# Patient Record
Sex: FEMALE | Race: WHITE
[De-identification: names, ages, dates, MRNs, and addresses within clinical notes are randomized per-mention and may not be internally consistent; named-entity substitution may affect disease eponyms.]

---

## 2019-04-14 ENCOUNTER — HOSPITAL ENCOUNTER (EMERGENCY)
Dept: HOSPITAL 84 - D.ER | Age: 49
Discharge: HOME | End: 2019-04-14
Payer: COMMERCIAL

## 2019-04-14 DIAGNOSIS — G43.909: Primary | ICD-10-CM

## 2019-06-24 ENCOUNTER — HOSPITAL ENCOUNTER (OUTPATIENT)
Dept: HOSPITAL 84 - D.LABREF | Age: 49
Discharge: HOME | End: 2019-06-24
Attending: INTERNAL MEDICINE
Payer: COMMERCIAL

## 2019-06-24 VITALS — BODY MASS INDEX: 29.8 KG/M2

## 2019-06-24 DIAGNOSIS — D64.9: ICD-10-CM

## 2019-06-24 DIAGNOSIS — Z12.11: Primary | ICD-10-CM

## 2019-06-24 LAB
BASOPHILS NFR BLD AUTO: 0.4 % (ref 0–2)
EOSINOPHIL NFR BLD: 2.2 % (ref 0–7)
ERYTHROCYTE [DISTWIDTH] IN BLOOD BY AUTOMATED COUNT: 14.2 % (ref 11.5–14.5)
HCT VFR BLD CALC: 34.2 % (ref 36–48)
HGB BLD-MCNC: 11.5 G/DL (ref 12–16)
IMM GRANULOCYTES NFR BLD: 0 % (ref 0–5)
LYMPHOCYTES NFR BLD AUTO: 28.4 % (ref 15–50)
MCH RBC QN AUTO: 28.8 PG (ref 26–34)
MCHC RBC AUTO-ENTMCNC: 33.6 G/DL (ref 31–37)
MCV RBC: 85.7 FL (ref 80–100)
MONOCYTES NFR BLD: 7.8 % (ref 2–11)
NEUTROPHILS NFR BLD AUTO: 61.2 % (ref 40–80)
PLATELET # BLD: 242 10X3/UL (ref 130–400)
PMV BLD AUTO: 10.1 FL (ref 7.4–10.4)
RBC # BLD AUTO: 3.99 10X6/UL (ref 4–5.4)
WBC # BLD AUTO: 5 10X3/UL (ref 4.8–10.8)

## 2019-07-13 ENCOUNTER — HOSPITAL ENCOUNTER (INPATIENT)
Dept: HOSPITAL 84 - D.ER | Age: 49
LOS: 1 days | Discharge: LEFT BEFORE DISCHARGE | DRG: 638 | End: 2019-07-14
Attending: FAMILY MEDICINE | Admitting: FAMILY MEDICINE
Payer: MEDICAID

## 2019-07-13 VITALS
WEIGHT: 183.38 LBS | WEIGHT: 183.38 LBS | BODY MASS INDEX: 27.79 KG/M2 | HEIGHT: 68 IN | BODY MASS INDEX: 27.79 KG/M2 | HEIGHT: 68 IN

## 2019-07-13 DIAGNOSIS — I10: ICD-10-CM

## 2019-07-13 DIAGNOSIS — I31.8: ICD-10-CM

## 2019-07-13 DIAGNOSIS — N39.0: ICD-10-CM

## 2019-07-13 DIAGNOSIS — E78.5: ICD-10-CM

## 2019-07-13 DIAGNOSIS — E11.65: Primary | ICD-10-CM

## 2019-07-13 DIAGNOSIS — D16.02: ICD-10-CM

## 2019-07-13 DIAGNOSIS — N28.1: ICD-10-CM

## 2019-07-13 DIAGNOSIS — N28.0: ICD-10-CM

## 2019-07-13 LAB
ALBUMIN SERPL-MCNC: 4.3 G/DL (ref 3.4–5)
ALP SERPL-CCNC: 122 U/L (ref 46–116)
ALT SERPL-CCNC: 33 U/L (ref 10–68)
ANION GAP SERPL CALC-SCNC: 14.5 MMOL/L (ref 8–16)
APPEARANCE UR: CLEAR
BACTERIA #/AREA URNS HPF: (no result) /HPF
BASOPHILS NFR BLD AUTO: 0.2 % (ref 0–2)
BILIRUB SERPL-MCNC: 0.22 MG/DL (ref 0.2–1.3)
BILIRUB SERPL-MCNC: NEGATIVE MG/DL
BUN SERPL-MCNC: 20 MG/DL (ref 7–18)
CALCIUM SERPL-MCNC: 9.7 MG/DL (ref 8.5–10.1)
CHLORIDE SERPL-SCNC: 92 MMOL/L (ref 98–107)
CO2 SERPL-SCNC: 27.7 MMOL/L (ref 21–32)
COLOR UR: (no result)
CREAT SERPL-MCNC: 1.5 MG/DL (ref 0.6–1.3)
EOSINOPHIL NFR BLD: 0.6 % (ref 0–7)
ERYTHROCYTE [DISTWIDTH] IN BLOOD BY AUTOMATED COUNT: 13.5 % (ref 11.5–14.5)
EST. AVERAGE GLUCOSE BLD GHB EST-MCNC: 229 MG/DL (ref 74–154)
GLOBULIN SER-MCNC: 5 G/L
GLUCOSE SERPL-MCNC: 1000 MG/DL
GLUCOSE SERPL-MCNC: 587 MG/DL (ref 74–106)
HCT VFR BLD CALC: 39.1 % (ref 36–48)
HGB BLD-MCNC: 13.2 G/DL (ref 12–16)
IMM GRANULOCYTES NFR BLD: 0.2 % (ref 0–5)
KETONES UR STRIP-MCNC: NEGATIVE MG/DL
LYMPHOCYTES NFR BLD AUTO: 18.5 % (ref 15–50)
MAGNESIUM SERPL-MCNC: 2.2 MG/DL (ref 1.8–2.4)
MCH RBC QN AUTO: 29.3 PG (ref 26–34)
MCHC RBC AUTO-ENTMCNC: 33.8 G/DL (ref 31–37)
MCV RBC: 86.7 FL (ref 80–100)
MONOCYTES NFR BLD: 7.3 % (ref 2–11)
NEUTROPHILS NFR BLD AUTO: 73.2 % (ref 40–80)
NITRITE UR-MCNC: NEGATIVE MG/ML
OSMOLALITY SERPL CALC.SUM OF ELEC: 290 MOSM/KG (ref 275–300)
PH UR STRIP: 5 [PH] (ref 5–6)
PLATELET # BLD: 251 10X3/UL (ref 130–400)
PMV BLD AUTO: 10.4 FL (ref 7.4–10.4)
POTASSIUM SERPL-SCNC: 4.2 MMOL/L (ref 3.5–5.1)
PROT SERPL-MCNC: 9.3 G/DL (ref 6.4–8.2)
PROT UR-MCNC: NEGATIVE MG/DL
RBC # BLD AUTO: 4.51 10X6/UL (ref 4–5.4)
RBC #/AREA URNS HPF: (no result) /HPF (ref 0–5)
SODIUM SERPL-SCNC: 130 MMOL/L (ref 136–145)
SP GR UR STRIP: 1.01 (ref 1–1.02)
SQUAMOUS #/AREA URNS HPF: (no result) /HPF (ref 0–5)
UROBILINOGEN UR-MCNC: NORMAL MG/DL
WBC # BLD AUTO: 8.1 10X3/UL (ref 4.8–10.8)

## 2019-07-13 NOTE — NUR
PT THO THE FLOOR VIA WHEELCHAIR. PT A&O X4. ASSISTED PT GETTING SET UP IN HER
ROOM. CALL LIGTH WITHIN REACH AND BED IN LOWEST POSITION.

## 2019-07-14 VITALS — SYSTOLIC BLOOD PRESSURE: 182 MMHG | DIASTOLIC BLOOD PRESSURE: 96 MMHG

## 2019-07-14 VITALS — SYSTOLIC BLOOD PRESSURE: 171 MMHG | DIASTOLIC BLOOD PRESSURE: 93 MMHG

## 2019-07-14 VITALS — DIASTOLIC BLOOD PRESSURE: 76 MMHG | SYSTOLIC BLOOD PRESSURE: 139 MMHG

## 2019-07-14 VITALS — SYSTOLIC BLOOD PRESSURE: 161 MMHG | DIASTOLIC BLOOD PRESSURE: 94 MMHG

## 2019-07-14 VITALS — SYSTOLIC BLOOD PRESSURE: 144 MMHG | DIASTOLIC BLOOD PRESSURE: 76 MMHG

## 2019-07-14 LAB
ALBUMIN SERPL-MCNC: 3.5 G/DL (ref 3.4–5)
ALP SERPL-CCNC: 103 U/L (ref 46–116)
ALT SERPL-CCNC: 24 U/L (ref 10–68)
ANION GAP SERPL CALC-SCNC: 13.5 MMOL/L (ref 8–16)
BASOPHILS NFR BLD AUTO: 0.3 % (ref 0–2)
BILIRUB SERPL-MCNC: 0.2 MG/DL (ref 0.2–1.3)
BUN SERPL-MCNC: 18 MG/DL (ref 7–18)
CALCIUM SERPL-MCNC: 8.8 MG/DL (ref 8.5–10.1)
CHLORIDE SERPL-SCNC: 100 MMOL/L (ref 98–107)
CHOLEST/HDLC SERPL: 3.3 RATIO (ref 2.3–4.1)
CO2 SERPL-SCNC: 25.8 MMOL/L (ref 21–32)
CREAT SERPL-MCNC: 1.1 MG/DL (ref 0.6–1.3)
EOSINOPHIL NFR BLD: 1.2 % (ref 0–7)
ERYTHROCYTE [DISTWIDTH] IN BLOOD BY AUTOMATED COUNT: 13.3 % (ref 11.5–14.5)
EST. AVERAGE GLUCOSE BLD GHB EST-MCNC: 246 MG/DL (ref 74–154)
GLOBULIN SER-MCNC: 4.3 G/L
GLUCOSE SERPL-MCNC: 321 MG/DL (ref 74–106)
HCT VFR BLD CALC: 35.5 % (ref 36–48)
HDLC SERPL-MCNC: 67 MG/DL (ref 32–96)
HGB BLD-MCNC: 12 G/DL (ref 12–16)
IMM GRANULOCYTES NFR BLD: 0.3 % (ref 0–5)
LDL-HDL RATIO: 2 RATIO (ref 1.5–3.5)
LDLC SERPL-MCNC: 136 MG/DL (ref 0–100)
LYMPHOCYTES NFR BLD AUTO: 24.9 % (ref 15–50)
MCH RBC QN AUTO: 29.1 PG (ref 26–34)
MCHC RBC AUTO-ENTMCNC: 33.8 G/DL (ref 31–37)
MCV RBC: 86 FL (ref 80–100)
MONOCYTES NFR BLD: 6.3 % (ref 2–11)
NEUTROPHILS NFR BLD AUTO: 67 % (ref 40–80)
OSMOLALITY SERPL CALC.SUM OF ELEC: 283 MOSM/KG (ref 275–300)
PLATELET # BLD: 190 10X3/UL (ref 130–400)
PMV BLD AUTO: 10.6 FL (ref 7.4–10.4)
POTASSIUM SERPL-SCNC: 4.3 MMOL/L (ref 3.5–5.1)
PROT SERPL-MCNC: 7.8 G/DL (ref 6.4–8.2)
RBC # BLD AUTO: 4.13 10X6/UL (ref 4–5.4)
SODIUM SERPL-SCNC: 135 MMOL/L (ref 136–145)
TRIGL SERPL-MCNC: 91 MG/DL (ref 30–200)
WBC # BLD AUTO: 6.8 10X3/UL (ref 4.8–10.8)

## 2019-07-14 NOTE — NUR
I have reviewed this patient and I concur with the Shift Assessment completed
by the Licensed Practical Nurse today this shift

## 2019-07-14 NOTE — NUR
PT STATED SHE WENT TO THE BATHROOM AND WHEN SHE WIPED SHE FOUND BRIGHT RED
BLOOD. I INSPECTED PTS BOTTOM AND RECTUM, I FOUND REMINANTS FO MARITZA BLOOD SHE
WAS TALKING ABOUT BUT NO ACTIVE BLEEDING. NOTED SOME EXCORIATION ON HER BOTTOM
THAT I SUSPECT IS THE CAUSE.S HE REPORTS NO PAIN AT THIS TIME, PT THINKS IT'S
BECAUSE THE HOSPITALS TOILET PAPER IS NOT AS SOFT AS HER HOME TISSUE. WILL
REQUEST CALMACEPTINE ORDER. PT IS IN NO OBVIOUS DISTRESS.  AT BEDSIDE,
NO OTHER COMPLAINTS/CONCERNS/QUESETIONS AT THIS TIME. CL IN REACH, SRX2.

## 2019-07-14 NOTE — NUR
PT ALERT AND ORIENTED LYING IN BED,  AT BEDSDIE. GAVE PT ORDERED
CALMACEPTINE. PT AND  ARE VERY UPSET. STATES SHE WAS ADMITTED LAST
NIGHT FOR OBSERVATION ONLY AND SHE WAS SUPPOSED TO GO HOME THIS MORNING. THEY
ARE UPSET THAT THEY HAVEN'T SPOKEN TO THE DOCTOR OR APN YE. I EXPLAINED THAT
TODAYS DR SUNSHINE A LITTLE LATER, BUT THAT HE WOULD BE HERE TO SEE HER. ALSO
EXPLAINED THAT THE DOCTORS ESTEFANI HAS ALREADY APPROVED HER MEDS THIS MORNING (SEE
PREVIOUS NOTES) AND HAS READ AND FOLLOWED THE PTS PROGRESS. SPOKE WITTH ESTEFANI SANCHEZ ABOUT THE PT AND FAMILYS AGGRIVATION, SHE STATED SHE UNDERSTANDS AND IS
ON HER WAY UP HERE, BUT THAT SHE HAS BEEN BUSY SEEING OTHER PTS. CL IN REACH,
SRX2.

## 2019-07-14 NOTE — NUR
DR. NARAYANAN SPOKE TO PT EXPLAINING ALL OF HER CARE, WHAT WAS BEING DONE AND
WHAT THE PLAN WAS AND WHY SHE WAS HERE. PT LISTENED TO ALL OF THIS AND DECIDED
SHE WANTD TO LEAVE AMA. I HEARD DR. NARAYANAN EXPLAIN THE RISKS AND THE PT
STATED "I UNDERSTAND BUT I STILL WANT TO GO, I FEEL LIKE NOTHING IS BEING DONE
AND I'LL DO BETTER AT MY URGENT CARE PRIMARY DRKaren". DR NARAYANAN TRIED TO
CONVINCE THE PT TO STAY BUT SHE REFUSED. I WILL GET THE APPROPRIATE PAPERWORK.

## 2019-07-14 NOTE — NUR
PATIENT LAYING IN BED IN SUPINE POSITION READING A BOOK. PATIENT STATES NO
NEEDS AT THIS TIME CALL LIGHT WITHIN REACH AND BED IN LOWEST POSITION.

## 2019-07-14 NOTE — NUR
PT AWAKE AND ORIENTED, WHEN ENTERING THE ROOM PTS HAD CROSSED ARMS AND WAS
UPSET ABOUT THE FACT THEY HAD NOT RESTARTED HER MEDICATIONS WHEN SHE CAME IN
AFTER MIDNIGHT. I EXPLAINED TO THE PT I WOULD CALL AND RESTART WHAT I COULD,
BUT THAT WE WOULDN'T HAVE CALLED THE DR THAT LATE UNLESS IT WAS AN EMERGENCY.
PT IS MAD, STATING SHE NEEDS HER PAIN MEDS RIGHT NOW. CALLED ESTEFANI SANCHEZ AND
RESTARTED ALL PTS HOME MEDICATIONS EXCEPT IRON (PER APN ORDER). CL IN REACH,

## 2019-07-15 NOTE — MORECARE
CASE MANAGEMENT DISCHARGE SUMMARY
 
 
PATIENT: NHI SEE                     UNIT: [id number]
ACCOUNT#: [id number]                       ADM DATE: 19
AGE: 49     : [birth year]  SEX: F            ROOM/BED: D.2103    
AUTHOR: GRAEME SHELTON                             PHYSICIAN:                               
 
REFERRING PHYSICIAN: VINCENZO MIMS MD           
DATE OF SERVICE: 07/15/19
Discharge Plan
 
 
Patient Name: NHI SEE
Facility: [facility]:[place]
Encounter #: [id number]
Medical Record #: [id number]
: 1970
Planned Disposition: Left Against Medical Advice
Anticipated Discharge Date: 19
 
Discharge Date: 2019
Expected LOS: 1
Initial Reviewer: [email or web address]
Initial Review Date: 07/15/2019
Generated: 7/15/19   9:28 am 
  
 
 
 
 
 
 
 
Patient Name: NHI SEE
 
Encounter #: [id number]
Page [id number]
 
 
 
 
 
Electronically Signed by GRAEME SHELTON on 07/15/19 at 0828
 
 
 
 
 
 
**All edits/amendments must be made on the electronic document**
 
DICTATION DATE: 07/15/19 0827     : FLORIN  07/15/19 0827     
RPT#: [id number]                                DC DATE:19
                                               STATUS: DIS IN  
[facility]
[address] [place] [address]
***END OF REPORT***